# Patient Record
Sex: MALE | Race: OTHER | Employment: FULL TIME | URBAN - METROPOLITAN AREA
[De-identification: names, ages, dates, MRNs, and addresses within clinical notes are randomized per-mention and may not be internally consistent; named-entity substitution may affect disease eponyms.]

---

## 2024-04-09 NOTE — PROGRESS NOTES
Wellness Exam    CC: Patient is presenting for a wellness exam and to establish care.    HPI:   Current Complaints:   Anxiety/depression- pt used to be on duloxetine in the past. Did not like how it made him feel, stopped it. Would like to try a different medication.  Pt is also struggling with binge eating.  Hx of tobacco use over 11 years. Used to be pack per day. Now down to 4 cigarettes per day.     Pt has been experiencing cloudy urine x1 wk. Denies dysuria, urinary frequency, suprapubic tenderness.  Denies STD exposure.    Pertinent Family History:   Family History   Problem Relation Age of Onset    Other (htn) Mother     Diabetes Mother     Other (htn) Father     Heart Attack Father     Other (Other) Maternal Grandmother     Diabetes Maternal Grandmother     Diabetes Maternal Grandfather     Other (Other) Maternal Grandfather     Other (smoker) Paternal Grandmother     Heart Attack Paternal Grandfather         Last Health Maintenance Exam: unknown   Colonoscopy:  No recommendations at this time   PSA:  There are no preventive care reminders to display for this patient.   Reported Health: good.    History reviewed. No pertinent past medical history.  Past Surgical History:   Procedure Laterality Date    Appendectomy  01/2010     Social History     Socioeconomic History    Marital status: Single   Tobacco Use    Smoking status: Every Day     Current packs/day: 0.50     Average packs/day: 0.5 packs/day for 11.3 years (5.6 ttl pk-yrs)     Types: Cigarettes     Start date: 01/2013    Smokeless tobacco: Never   Vaping Use    Vaping status: Never Used   Substance and Sexual Activity    Drug use: Never    Sexual activity: Yes     Partners: Female   Other Topics Concern     Service No    Blood Transfusions No    Caffeine Concern Yes    Occupational Exposure No    Hobby Hazards No    Sleep Concern Yes    Stress Concern No    Weight Concern Yes    Special Diet No    Back Care No    Exercise No    Bike Helmet No     Seat Belt Yes    Self-Exams No     Current Outpatient Medications on File Prior to Visit   Medication Sig Dispense Refill    ergocalciferol 1.25 MG (65883 UT) Oral Cap TAKE 1 CAPSULE BY MOUTH ONCE A WEEK AS DIRECTED      FLUoxetine 10 MG Oral Cap  (Patient not taking: Reported on 4/10/2024)       No current facility-administered medications on file prior to visit.       Review of Systems   Constitutional: Negative for fever, chills and fatigue.   HENT: Negative for hearing loss, congestion, sore throat and neck pain.    Eyes: Negative for pain and visual disturbance.   Respiratory: Negative for cough and shortness of breath.    Cardiovascular: Negative for chest pain and palpitations.   Gastrointestinal: Negative for nausea, vomiting, abdominal pain and diarrhea.   Genitourinary: Negative for urgency, frequency and difficulty urinating.   Musculoskeletal: Negative for arthralgias and gait problem.   Skin: Negative for color change and rash.   Neurological: Negative for tremors, weakness and numbness.   Hematological: Negative for adenopathy. Does not bruise/bleed easily.   Psychiatric/Behavioral: Negative for confusion and agitation. The patient is not nervous/anxious.      /66   Pulse 94   Temp 97 °F (36.1 °C) (Oral)   Resp 16   Ht 6' 1\" (1.854 m)   Wt (!) 346 lb (156.9 kg)   SpO2 98%   BMI 45.65 kg/m²   Physical Exam   Constitutional: He is oriented to person, place, and time. He appears well-developed. No distress.   HENT: Normocephalic and atraumatic. Nose normal. TMs pearly gray, + light reflex.  Mucous membranes moist, dentition normal.  Oropharynx without erythema, exudate or tonsillar hypertrophy  Eyes: EOM are normal. Pupils are equal, round, and reactive to light. No scleral icterus. Fundoscopic exam: Red reflex b/l. No exudates, hemorrhages, a/v nicking, or papilledema seen b/l.  Neck: Normal range of motion. No thyromegaly present.   Cardiovascular: Normal rate, regular rhythm and normal  heart sounds.  Exam reveals no friction rub, no murmur heard.  Pulmonary/Chest: Effort normal and breath sounds normal b/l. He has no wheezes or rales.   Abdominal: Soft. Bowel sounds are normal. There is no tenderness. No HSM.  Abdominal aorta normal in size, no hernia appreciated.  Musculoskeletal: Normal range of motion. He exhibits no edema.   Lymphadenopathy: He has no cervical or supraclavicular adenopathy.   : deferred  Neurological: He is alert and oriented to person, place, and time.  DTRs +2 and symmetric b/l.   Skin: Skin is warm. No rash noted. No erythema, pallor or jaundice.   Psychiatric: He has a normal mood and affect. His behavior is normal.       Assessment and Plan:  Ramana Santoyo is a 28 year old male here for a wellness exam  Age appropriate cancer screening, labs, safety, immunizations were discussed with the patient and ordered as follows:    Ramana was seen today for physical.    Diagnoses and all orders for this visit:    Annual physical exam    Laboratory exam ordered as part of routine general medical examination  -     CBC With Differential With Platelet; Future  -     Comp Metabolic Panel (14); Future  -     Lipid Panel; Future  -     TSH W Reflex To Free T4; Future    Vitamin D deficiency  -     Vitamin D; Future    BMI 40.0-44.9, adult (HCC)  Binge eating  -     buPROPion  MG Oral Tablet 24 Hr; Take 1 tablet (150 mg total) by mouth daily.    Mixed anxiety and depressive disorder  -     buPROPion  MG Oral Tablet 24 Hr; Take 1 tablet (150 mg total) by mouth daily.   -follow up in 6wks  Cigarette nicotine dependence with other nicotine-induced disorder  -     buPROPion  MG Oral Tablet 24 Hr; Take 1 tablet (150 mg total) by mouth daily.    Screening examination for STD (sexually transmitted disease)  -     T Pallidum Screening [place]; Future  -     HIV AG AB Combo; Future  -     Urine Chlamydia/GC Amplification; Future    Cloudy urine  -     Urine Chlamydia/GC  Amplification; Future  -     UA/M WITH CULTURE REFLEX [3020]; Future    Other orders  -     TdaP (Adacel, Boostrix) [28013]       Orders Placed This Encounter   Procedures    CBC With Differential With Platelet     Standing Status:   Future     Number of Occurrences:   1     Standing Expiration Date:   4/10/2025    Comp Metabolic Panel (14)     Standing Status:   Future     Number of Occurrences:   1     Standing Expiration Date:   4/5/2025    Lipid Panel     Standing Status:   Future     Number of Occurrences:   1     Standing Expiration Date:   4/5/2025    TSH W Reflex To Free T4     Standing Status:   Future     Number of Occurrences:   1     Standing Expiration Date:   4/5/2025    Vitamin D     Standing Status:   Future     Number of Occurrences:   1     Standing Expiration Date:   4/10/2025     Order Specific Question:   Please pick the scenario that best fits the purpose for ordering this test     Answer:   General Screening/Vit D deficiency (25-Hydroxy)     Order Specific Question:   Release to patient     Answer:   Immediate    T Pallidum Screening [place]     Standing Status:   Future     Number of Occurrences:   1     Standing Expiration Date:   4/10/2025     Order Specific Question:   Release to patient     Answer:   Immediate    HIV AG AB Combo     Standing Status:   Future     Number of Occurrences:   1     Standing Expiration Date:   4/10/2025    UA/M WITH CULTURE REFLEX [3020]     Standing Status:   Future     Number of Occurrences:   1     Standing Expiration Date:   4/10/2025     Order Specific Question:   Release to patient     Answer:   Immediate      Health Maintenance Due   Topic Date Due    Annual Physical  Never done    DTaP,Tdap,and Td Vaccines (1 - Tdap) Never done    COVID-19 Vaccine (1 - 2023-24 season) Never done    Annual Depression Screening  Never done        His 5 year prevention plan includes: annual physical and labs. 30 mins exercise most days of the week and heart healthy diet    Patient/Caregiver Education:  Patient/Caregiver Education: There are no barriers to learning. Medical education done.  Outcome: Patient verbalizes understanding.      Return in 12m for annual physical. Return in 6wks for med check.  Educated by: JC Valverde

## 2024-04-10 ENCOUNTER — LAB ENCOUNTER (OUTPATIENT)
Dept: LAB | Age: 29
End: 2024-04-10
Payer: COMMERCIAL

## 2024-04-10 ENCOUNTER — OFFICE VISIT (OUTPATIENT)
Dept: INTERNAL MEDICINE CLINIC | Facility: CLINIC | Age: 29
End: 2024-04-10
Payer: COMMERCIAL

## 2024-04-10 VITALS
WEIGHT: 315 LBS | HEART RATE: 94 BPM | RESPIRATION RATE: 16 BRPM | TEMPERATURE: 97 F | OXYGEN SATURATION: 98 % | HEIGHT: 73 IN | SYSTOLIC BLOOD PRESSURE: 120 MMHG | DIASTOLIC BLOOD PRESSURE: 66 MMHG | BODY MASS INDEX: 41.75 KG/M2

## 2024-04-10 DIAGNOSIS — Z11.3 SCREENING EXAMINATION FOR STD (SEXUALLY TRANSMITTED DISEASE): ICD-10-CM

## 2024-04-10 DIAGNOSIS — Z00.00 LABORATORY EXAM ORDERED AS PART OF ROUTINE GENERAL MEDICAL EXAMINATION: ICD-10-CM

## 2024-04-10 DIAGNOSIS — F41.8 MIXED ANXIETY AND DEPRESSIVE DISORDER: ICD-10-CM

## 2024-04-10 DIAGNOSIS — Z00.00 ANNUAL PHYSICAL EXAM: Primary | ICD-10-CM

## 2024-04-10 DIAGNOSIS — E55.9 VITAMIN D DEFICIENCY: ICD-10-CM

## 2024-04-10 DIAGNOSIS — R82.90 CLOUDY URINE: ICD-10-CM

## 2024-04-10 DIAGNOSIS — F17.218 CIGARETTE NICOTINE DEPENDENCE WITH OTHER NICOTINE-INDUCED DISORDER: ICD-10-CM

## 2024-04-10 DIAGNOSIS — R63.2 BINGE EATING: ICD-10-CM

## 2024-04-10 DIAGNOSIS — Z82.49 FAMILY HISTORY OF CARDIAC DISORDER: ICD-10-CM

## 2024-04-10 PROBLEM — S39.012A LOW BACK STRAIN: Status: ACTIVE | Noted: 2023-08-10

## 2024-04-10 PROBLEM — F17.200 NICOTINE DEPENDENCE: Status: ACTIVE | Noted: 2023-08-10

## 2024-04-10 PROBLEM — E66.9 OBESITY WITH BODY MASS INDEX 30 OR GREATER: Status: RESOLVED | Noted: 2023-08-10 | Resolved: 2024-04-10

## 2024-04-10 PROBLEM — M54.50 LOW BACK PAIN: Status: ACTIVE | Noted: 2023-08-10

## 2024-04-10 PROBLEM — E66.9 OBESITY WITH BODY MASS INDEX 30 OR GREATER: Status: ACTIVE | Noted: 2023-08-10

## 2024-04-10 PROBLEM — E66.9 OBESE: Status: ACTIVE | Noted: 2023-09-26

## 2024-04-10 LAB
ALBUMIN SERPL-MCNC: 3.8 G/DL (ref 3.4–5)
ALBUMIN/GLOB SERPL: 1 {RATIO} (ref 1–2)
ALP LIVER SERPL-CCNC: 53 U/L
ALT SERPL-CCNC: 60 U/L
ANION GAP SERPL CALC-SCNC: 7 MMOL/L (ref 0–18)
AST SERPL-CCNC: 29 U/L (ref 15–37)
BASOPHILS # BLD AUTO: 0.06 X10(3) UL (ref 0–0.2)
BASOPHILS NFR BLD AUTO: 0.6 %
BILIRUB SERPL-MCNC: 0.7 MG/DL (ref 0.1–2)
BILIRUB UR QL STRIP.AUTO: NEGATIVE
BUN BLD-MCNC: 14 MG/DL (ref 9–23)
CALCIUM BLD-MCNC: 9.1 MG/DL (ref 8.5–10.1)
CHLORIDE SERPL-SCNC: 107 MMOL/L (ref 98–112)
CHOLEST SERPL-MCNC: 155 MG/DL (ref ?–200)
CLARITY UR REFRACT.AUTO: CLEAR
CO2 SERPL-SCNC: 23 MMOL/L (ref 21–32)
CREAT BLD-MCNC: 0.76 MG/DL
EGFRCR SERPLBLD CKD-EPI 2021: 126 ML/MIN/1.73M2 (ref 60–?)
EOSINOPHIL # BLD AUTO: 0.37 X10(3) UL (ref 0–0.7)
EOSINOPHIL NFR BLD AUTO: 3.7 %
ERYTHROCYTE [DISTWIDTH] IN BLOOD BY AUTOMATED COUNT: 13.2 %
FASTING PATIENT LIPID ANSWER: YES
FASTING STATUS PATIENT QL REPORTED: YES
GLOBULIN PLAS-MCNC: 3.9 G/DL (ref 2.8–4.4)
GLUCOSE BLD-MCNC: 99 MG/DL (ref 70–99)
GLUCOSE UR STRIP.AUTO-MCNC: NORMAL MG/DL
HCT VFR BLD AUTO: 46.7 %
HDLC SERPL-MCNC: 34 MG/DL (ref 40–59)
HGB BLD-MCNC: 16 G/DL
IMM GRANULOCYTES # BLD AUTO: 0.04 X10(3) UL (ref 0–1)
IMM GRANULOCYTES NFR BLD: 0.4 %
KETONES UR STRIP.AUTO-MCNC: NEGATIVE MG/DL
LDLC SERPL CALC-MCNC: 103 MG/DL (ref ?–100)
LEUKOCYTE ESTERASE UR QL STRIP.AUTO: NEGATIVE
LYMPHOCYTES # BLD AUTO: 2.67 X10(3) UL (ref 1–4)
LYMPHOCYTES NFR BLD AUTO: 26.6 %
MCH RBC QN AUTO: 29.6 PG (ref 26–34)
MCHC RBC AUTO-ENTMCNC: 34.3 G/DL (ref 31–37)
MCV RBC AUTO: 86.3 FL
MONOCYTES # BLD AUTO: 0.78 X10(3) UL (ref 0.1–1)
MONOCYTES NFR BLD AUTO: 7.8 %
NEUTROPHILS # BLD AUTO: 6.1 X10 (3) UL (ref 1.5–7.7)
NEUTROPHILS # BLD AUTO: 6.1 X10(3) UL (ref 1.5–7.7)
NEUTROPHILS NFR BLD AUTO: 60.9 %
NITRITE UR QL STRIP.AUTO: NEGATIVE
NONHDLC SERPL-MCNC: 121 MG/DL (ref ?–130)
OSMOLALITY SERPL CALC.SUM OF ELEC: 285 MOSM/KG (ref 275–295)
PH UR STRIP.AUTO: 5 [PH] (ref 5–8)
PLATELET # BLD AUTO: 177 10(3)UL (ref 150–450)
POTASSIUM SERPL-SCNC: 4 MMOL/L (ref 3.5–5.1)
PROT SERPL-MCNC: 7.7 G/DL (ref 6.4–8.2)
PROT UR STRIP.AUTO-MCNC: NEGATIVE MG/DL
RBC # BLD AUTO: 5.41 X10(6)UL
RBC UR QL AUTO: NEGATIVE
SODIUM SERPL-SCNC: 137 MMOL/L (ref 136–145)
SP GR UR STRIP.AUTO: 1.02 (ref 1–1.03)
T PALLIDUM AB SER QL IA: NONREACTIVE
TRIGL SERPL-MCNC: 96 MG/DL (ref 30–149)
TSI SER-ACNC: 1.32 MIU/ML (ref 0.36–3.74)
UROBILINOGEN UR STRIP.AUTO-MCNC: NORMAL MG/DL
VIT D+METAB SERPL-MCNC: 40.8 NG/ML (ref 30–100)
VLDLC SERPL CALC-MCNC: 16 MG/DL (ref 0–30)
WBC # BLD AUTO: 10 X10(3) UL (ref 4–11)

## 2024-04-10 PROCEDURE — 85025 COMPLETE CBC W/AUTO DIFF WBC: CPT

## 2024-04-10 PROCEDURE — 82306 VITAMIN D 25 HYDROXY: CPT

## 2024-04-10 PROCEDURE — 87591 N.GONORRHOEAE DNA AMP PROB: CPT

## 2024-04-10 PROCEDURE — 99385 PREV VISIT NEW AGE 18-39: CPT

## 2024-04-10 PROCEDURE — 96127 BRIEF EMOTIONAL/BEHAV ASSMT: CPT

## 2024-04-10 PROCEDURE — 36415 COLL VENOUS BLD VENIPUNCTURE: CPT

## 2024-04-10 PROCEDURE — 87491 CHLMYD TRACH DNA AMP PROBE: CPT

## 2024-04-10 PROCEDURE — 99203 OFFICE O/P NEW LOW 30 MIN: CPT

## 2024-04-10 PROCEDURE — 80061 LIPID PANEL: CPT

## 2024-04-10 PROCEDURE — 81003 URINALYSIS AUTO W/O SCOPE: CPT

## 2024-04-10 PROCEDURE — 84443 ASSAY THYROID STIM HORMONE: CPT

## 2024-04-10 PROCEDURE — 80053 COMPREHEN METABOLIC PANEL: CPT

## 2024-04-10 PROCEDURE — 90715 TDAP VACCINE 7 YRS/> IM: CPT

## 2024-04-10 PROCEDURE — 87389 HIV-1 AG W/HIV-1&-2 AB AG IA: CPT

## 2024-04-10 PROCEDURE — 90471 IMMUNIZATION ADMIN: CPT

## 2024-04-10 PROCEDURE — 86780 TREPONEMA PALLIDUM: CPT

## 2024-04-10 RX ORDER — ERGOCALCIFEROL 1.25 MG/1
CAPSULE ORAL
COMMUNITY

## 2024-04-10 RX ORDER — FLUOXETINE 10 MG/1
CAPSULE ORAL
COMMUNITY

## 2024-04-10 RX ORDER — BUPROPION HYDROCHLORIDE 150 MG/1
150 TABLET ORAL DAILY
Qty: 30 TABLET | Refills: 0 | Status: SHIPPED | OUTPATIENT
Start: 2024-04-10

## 2024-04-11 LAB
C TRACH DNA SPEC QL NAA+PROBE: NEGATIVE
N GONORRHOEA DNA SPEC QL NAA+PROBE: NEGATIVE

## 2024-05-21 NOTE — PROGRESS NOTES
Ramana Santoyo is a 29 year old male.  HPI:   HPI   Pt presents with urinary concerned. Few days ago noted grainy, orange output when urinated. Is experiencing bladder discomfort, urinary frequency and low back pain. Had noted cloudy urine in the past. Hx of kidney stone.  Denies dysuria, fever, recent STD exposure  Recent labs in April showed unremarkable UA, kidney function WNL, negative for STD.     Last month was started on Wellbutrin for tobacco cessation, anxiety/depression, and binge eating. States it has not helped with tobacco cessation but he has not tried to pick a day and actively cut down on smoking. It has helped with binge eating. Stating he still has some days with depression.   Current Outpatient Medications   Medication Sig Dispense Refill    buPROPion  MG Oral Tablet 24 Hr Take 1 tablet (300 mg total) by mouth daily. 30 tablet 0      No past medical history on file.   Social History:  Social History     Socioeconomic History    Marital status: Single   Tobacco Use    Smoking status: Every Day     Current packs/day: 0.50     Average packs/day: 0.5 packs/day for 11.4 years (5.7 ttl pk-yrs)     Types: Cigarettes     Start date: 01/2013    Smokeless tobacco: Never   Vaping Use    Vaping status: Never Used   Substance and Sexual Activity    Alcohol use: Yes     Comment: rare    Drug use: Never    Sexual activity: Yes     Partners: Female   Other Topics Concern     Service No    Blood Transfusions No    Caffeine Concern Yes    Occupational Exposure No    Hobby Hazards No    Sleep Concern Yes    Stress Concern No    Weight Concern Yes    Special Diet No    Back Care No    Exercise No    Bike Helmet No    Seat Belt Yes    Self-Exams No        REVIEW OF SYSTEMS:   GENERAL HEALTH: feels well otherwise. Denies fever, chills, unintentional weight change  SKIN: denies any unusual skin lesions or rashes  RESPIRATORY: denies shortness of breath with exertion, denies cough or  wheezing  CARDIOVASCULAR: denies chest pain or palpitations, denies leg swelling  GI: denies abdominal pain and denies heartburn. Denies nausea, vomiting, diarrhea, constipation  NEURO: denies headaches, dizziness, weakness, syncope  PSYCH: denies anxiety, depression, insomnia    EXAM:   /84   Pulse 88   Temp 97.2 °F (36.2 °C)   Resp 16   Ht 6' 1\" (1.854 m)   Wt (!) 350 lb (158.8 kg)   SpO2 95%   BMI 46.18 kg/m²   GENERAL: well developed, well nourished,in no apparent distress  SKIN: no rashes,no suspicious lesions, warm and dry  HEENT: atraumatic, normocephalic,ears and throat are clear  NECK: supple,no adenopathy, no thyromegaly  LUNGS: clear to auscultation b/l no W/R/R  CARDIO: RRR without murmur  GI: good BS's,no masses, HSM, distension or tenderness  EXTREMITIES: no cyanosis, clubbing or edema  MUSCULOSKELETAL: FROM, no joint swelling or bony tenderness  NEURO: a/ox3, no focal deficits  PSYCH: mood and affect normal    ASSESSMENT AND PLAN:     Encounter Diagnoses   Name Primary?    Abnormal urine sediment Yes    Suprapubic tenderness     Frequent urination     Flank pain     History of kidney stones     Cigarette nicotine dependence with other nicotine-induced disorder     Mixed anxiety and depressive disorder     BMI 40.0-44.9, adult (HCC)     -UA in office negative for blood, nitrate, leuks   -check CT urogram   -Increase Wellbutrin to 300mg   -tobacco cessation education provided.  Requested Prescriptions     Signed Prescriptions Disp Refills    buPROPion  MG Oral Tablet 24 Hr 30 tablet 0     Sig: Take 1 tablet (300 mg total) by mouth daily.         The patient indicates understanding of these issues and agrees to the plan.  The patient is asked to return in 4 wks.

## 2024-05-22 ENCOUNTER — OFFICE VISIT (OUTPATIENT)
Dept: INTERNAL MEDICINE CLINIC | Facility: CLINIC | Age: 29
End: 2024-05-22

## 2024-05-22 VITALS
HEART RATE: 88 BPM | RESPIRATION RATE: 16 BRPM | TEMPERATURE: 97 F | OXYGEN SATURATION: 95 % | HEIGHT: 73 IN | DIASTOLIC BLOOD PRESSURE: 84 MMHG | BODY MASS INDEX: 41.75 KG/M2 | WEIGHT: 315 LBS | SYSTOLIC BLOOD PRESSURE: 132 MMHG

## 2024-05-22 DIAGNOSIS — R35.0 FREQUENT URINATION: ICD-10-CM

## 2024-05-22 DIAGNOSIS — R10.819 SUPRAPUBIC TENDERNESS: ICD-10-CM

## 2024-05-22 DIAGNOSIS — R82.90 ABNORMAL URINE SEDIMENT: Primary | ICD-10-CM

## 2024-05-22 DIAGNOSIS — Z87.442 HISTORY OF KIDNEY STONES: ICD-10-CM

## 2024-05-22 DIAGNOSIS — F41.8 MIXED ANXIETY AND DEPRESSIVE DISORDER: ICD-10-CM

## 2024-05-22 DIAGNOSIS — R10.9 FLANK PAIN: ICD-10-CM

## 2024-05-22 DIAGNOSIS — F17.218 CIGARETTE NICOTINE DEPENDENCE WITH OTHER NICOTINE-INDUCED DISORDER: ICD-10-CM

## 2024-05-22 LAB
BILIRUBIN: NEGATIVE
GLUCOSE (URINE DIPSTICK): NEGATIVE MG/DL
KETONES (URINE DIPSTICK): NEGATIVE MG/DL
LEUKOCYTES: NEGATIVE
MULTISTIX LOT#: NORMAL NUMERIC
NITRITE, URINE: NEGATIVE
OCCULT BLOOD: NEGATIVE
PH, URINE: 5.5 (ref 4.5–8)
PROTEIN (URINE DIPSTICK): NEGATIVE MG/DL
SPECIFIC GRAVITY: 1.02 (ref 1–1.03)
UROBILINOGEN,SEMI-QN: 0.2 MG/DL (ref 0–1.9)

## 2024-05-22 PROCEDURE — 99214 OFFICE O/P EST MOD 30 MIN: CPT

## 2024-05-22 PROCEDURE — 81003 URINALYSIS AUTO W/O SCOPE: CPT

## 2024-05-22 RX ORDER — BUPROPION HYDROCHLORIDE 300 MG/1
300 TABLET ORAL DAILY
Qty: 30 TABLET | Refills: 0 | Status: SHIPPED | OUTPATIENT
Start: 2024-05-22

## 2024-05-24 ENCOUNTER — PATIENT MESSAGE (OUTPATIENT)
Dept: ADMINISTRATIVE | Age: 29
End: 2024-05-24

## 2024-05-24 NOTE — TELEPHONE ENCOUNTER
[facility]  online tfor status    CT UROGRAM(W+WO) W/3D(CPT=74178/69098)          Referral #: [id number]      Scheduled For: 05/28/2024    Status: pending authorization     Case Number: [id number]         Patient notified of pending status via [employer].     Appt Desk > Noted

## 2024-05-28 ENCOUNTER — HOSPITAL ENCOUNTER (OUTPATIENT)
Dept: CT IMAGING | Age: 29
Discharge: HOME | End: 2024-05-28

## 2024-05-28 DIAGNOSIS — R82.90 ABNORMAL URINE SEDIMENT: ICD-10-CM

## 2024-05-28 DIAGNOSIS — R10.819 SUPRAPUBIC TENDERNESS: ICD-10-CM

## 2024-05-28 DIAGNOSIS — R35.0 FREQUENT URINATION: ICD-10-CM

## 2024-05-28 DIAGNOSIS — Z87.442 HISTORY OF KIDNEY STONES: ICD-10-CM

## 2024-05-28 DIAGNOSIS — R10.9 FLANK PAIN: ICD-10-CM

## 2024-05-28 PROCEDURE — 76377 3D RENDER W/INTRP POSTPROCES: CPT

## 2024-05-28 PROCEDURE — 74178 CT ABD&PLV WO CNTR FLWD CNTR: CPT

## 2024-05-29 ENCOUNTER — TELEPHONE (OUTPATIENT)
Dept: INTERNAL MEDICINE CLINIC | Facility: CLINIC | Age: 29
End: 2024-05-29

## 2024-06-01 ENCOUNTER — OFFICE VISIT (OUTPATIENT)
Dept: INTERNAL MEDICINE CLINIC | Facility: CLINIC | Age: 29
End: 2024-06-01
Payer: COMMERCIAL

## 2024-06-01 VITALS
WEIGHT: 315 LBS | OXYGEN SATURATION: 98 % | BODY MASS INDEX: 41.75 KG/M2 | HEIGHT: 73 IN | HEART RATE: 92 BPM | TEMPERATURE: 97 F | DIASTOLIC BLOOD PRESSURE: 84 MMHG | SYSTOLIC BLOOD PRESSURE: 138 MMHG | RESPIRATION RATE: 18 BRPM

## 2024-06-01 DIAGNOSIS — F17.210 CIGARETTE NICOTINE DEPENDENCE WITHOUT COMPLICATION: ICD-10-CM

## 2024-06-01 DIAGNOSIS — N30.90 CYSTITIS: Primary | ICD-10-CM

## 2024-06-01 PROCEDURE — 99214 OFFICE O/P EST MOD 30 MIN: CPT | Performed by: NURSE PRACTITIONER

## 2024-06-01 NOTE — PROGRESS NOTES
HPI:    Patient ID: Ramana Santoyo is a 29 year old male.    Chief Complaint   Patient presents with    Medication Follow-Up     Following up on Levaquin        He took 3 days of levaquin because he noted some tingling in arms and legs. He continues to feel slight tingling in legs. Otherwise all his other symptoms are resolved. His girlfriend is translating for him today   Prior to starting wellbutrin he was smoking a pack a day. He is currently down to 3 cigarettes a day.     Tobacco:  Social History     Tobacco Use   Smoking Status Every Day    Current packs/day: 0.50    Average packs/day: 0.5 packs/day for 11.4 years (5.7 ttl pk-yrs)    Types: Cigarettes    Start date: 01/2013   Smokeless Tobacco Never     E-Cigarettes/Vaping       Questions Responses    E-Cigarette Use Never User    Passive Exposure No    Counseling Given No          E-Cigarette/Vaping Substances       Questions Responses    Nicotine No    THC No    CBD No    Flavoring No          E-Cigarette/Vaping Devices       Questions Responses    Disposable No    Pre-filled or Refillable Cartridge No    Refillable Tank No    Pre-filled Pod No           Tobacco cessation counseling for 3-10 minutes (add E/M code #72770).      Review of Systems   Constitutional: Negative.    Respiratory: Negative.     Cardiovascular: Negative.    Gastrointestinal: Negative.    Genitourinary: Negative.    Neurological: Negative.          No past medical history on file.  Past Surgical History:   Procedure Laterality Date    Appendectomy  01/2010     Family History   Problem Relation Age of Onset    Other (htn) Mother     Diabetes Mother     Other (htn) Father     Heart Attack Father     Other (Other) Maternal Grandmother     Diabetes Maternal Grandmother     Diabetes Maternal Grandfather     Other (Other) Maternal Grandfather     Other (smoker) Paternal Grandmother     Heart Attack Paternal Grandfather      Social History     Socioeconomic History    Marital status: Single    Tobacco Use    Smoking status: Every Day     Current packs/day: 0.50     Average packs/day: 0.5 packs/day for 11.4 years (5.7 ttl pk-yrs)     Types: Cigarettes     Start date: 01/2013    Smokeless tobacco: Never   Vaping Use    Vaping status: Never Used   Substance and Sexual Activity    Alcohol use: Yes     Comment: rare    Drug use: Never    Sexual activity: Yes     Partners: Female   Other Topics Concern     Service No    Blood Transfusions No    Caffeine Concern Yes    Occupational Exposure No    Hobby Hazards No    Sleep Concern Yes    Stress Concern No    Weight Concern Yes    Special Diet No    Back Care No    Exercise No    Bike Helmet No    Seat Belt Yes    Self-Exams No          Current Outpatient Medications   Medication Sig Dispense Refill    levoFLOXacin (LEVAQUIN) 500 MG Oral Tab Take 1 tablet (500 mg total) by mouth daily for 5 days. 5 tablet 0    buPROPion  MG Oral Tablet 24 Hr Take 1 tablet (300 mg total) by mouth daily. 30 tablet 0     Allergies:No Known Allergies    Lab Results   Component Value Date    GLU 99 04/10/2024    BUN 14 04/10/2024    CREATSERUM 0.76 04/10/2024    ANIONGAP 7 04/10/2024    CA 9.1 04/10/2024    OSMOCALC 285 04/10/2024    ALKPHO 53 04/10/2024    AST 29 04/10/2024    ALT 60 04/10/2024    BILT 0.7 04/10/2024    TP 7.7 04/10/2024    ALB 3.8 04/10/2024    GLOBULIN 3.9 04/10/2024     04/10/2024    K 4.0 04/10/2024     04/10/2024    CO2 23.0 04/10/2024     Lab Results   Component Value Date    WBC 10.0 04/10/2024    RBC 5.41 04/10/2024    HGB 16.0 04/10/2024    HCT 46.7 04/10/2024    MCV 86.3 04/10/2024    MCH 29.6 04/10/2024    MCHC 34.3 04/10/2024    RDW 13.2 04/10/2024    .0 04/10/2024     Lab Results   Component Value Date    CHOLEST 155 04/10/2024    TRIG 96 04/10/2024    HDL 34 (L) 04/10/2024     (H) 04/10/2024    VLDL 16 04/10/2024    NONHDLC 121 04/10/2024     No results found for: \"EAG\", \"A1C\"  Lab Results   Component Value Date     TSH 1.320 04/10/2024     Lab Results   Component Value Date    VITD 40.8 04/10/2024     No results found for: \"JAZMYNE\"  No results found for: \"FOLIC\", \"FOLATESER\", \"FOLATE\"  No results found for: \"IRON\", \"IRONTOT\"  No results found for: \"B12\", \"VITB12\"  No results found for: \"PHOS\", \"PHOSPHORUS\"  No results found for: \"MG\"     PHYSICAL EXAM:   /84   Pulse 92   Temp 97.1 °F (36.2 °C) (Temporal)   Resp 18   Ht 6' 1\" (1.854 m)   Wt (!) 348 lb 6.4 oz (158 kg)   SpO2 98%   BMI 45.97 kg/m²   Physical Exam  Vitals and nursing note reviewed. Exam conducted with a chaperone present.   Constitutional:       Appearance: Normal appearance. He is obese.   Cardiovascular:      Rate and Rhythm: Normal rate and regular rhythm.      Pulses: Normal pulses.      Heart sounds: Normal heart sounds. No murmur heard.  Pulmonary:      Effort: Pulmonary effort is normal. No respiratory distress.      Breath sounds: Normal breath sounds. No wheezing, rhonchi or rales.   Abdominal:      Palpations: Abdomen is soft.      Tenderness: There is no abdominal tenderness.   Neurological:      Mental Status: He is alert and oriented to person, place, and time.              ASSESSMENT/PLAN:   Diagnoses and all orders for this visit:    Cystitis- resolved    Cigarette nicotine dependence without complication- at this time pick a quit date to discontinue nicotine use    BMI 40.0-44.9, adult (HCC)- weight loss with low carb diet 150 min moderate activity whit Muller, APRN

## 2024-06-04 ENCOUNTER — PATIENT MESSAGE (OUTPATIENT)
Dept: INTERNAL MEDICINE CLINIC | Facility: CLINIC | Age: 29
End: 2024-06-04

## 2024-06-04 DIAGNOSIS — N30.90 CYSTITIS: Primary | ICD-10-CM

## 2024-06-04 RX ORDER — SULFAMETHOXAZOLE AND TRIMETHOPRIM 800; 160 MG/1; MG/1
1 TABLET ORAL 2 TIMES DAILY
Qty: 14 TABLET | Refills: 0 | Status: SHIPPED | OUTPATIENT
Start: 2024-06-04 | End: 2024-06-11

## 2024-06-04 NOTE — TELEPHONE ENCOUNTER
RONEN Brown: Bactrim (160 mg/800 mg) orally twice daily x 7 days. Is symptoms return after completing the treatment to notify the office.

## 2024-06-04 NOTE — TELEPHONE ENCOUNTER
From: Ramana Santoyo  To: Stephanie Castano  Sent: 6/4/2024 7:57 AM CDT  Subject: Update     Hello    I know my test results keep coming up normal but this morning my urine came out the same. It’s making me very worried. Is there anything else that can cause this

## 2024-06-23 DIAGNOSIS — F17.218 CIGARETTE NICOTINE DEPENDENCE WITH OTHER NICOTINE-INDUCED DISORDER: ICD-10-CM

## 2024-06-23 DIAGNOSIS — F41.8 MIXED ANXIETY AND DEPRESSIVE DISORDER: ICD-10-CM

## 2024-06-23 RX ORDER — BUPROPION HYDROCHLORIDE 300 MG/1
300 TABLET ORAL DAILY
Qty: 30 TABLET | Refills: 0 | Status: SHIPPED | OUTPATIENT
Start: 2024-06-23

## 2024-06-23 NOTE — TELEPHONE ENCOUNTER
Last time medication was refilled: 5/22/24  Next office visit due/scheduled: no apt  Last office visit: 6/1/24  Last Labs: 4/10/24

## 2024-07-25 DIAGNOSIS — F41.8 MIXED ANXIETY AND DEPRESSIVE DISORDER: ICD-10-CM

## 2024-07-25 DIAGNOSIS — F17.218 CIGARETTE NICOTINE DEPENDENCE WITH OTHER NICOTINE-INDUCED DISORDER: ICD-10-CM

## 2024-07-25 RX ORDER — BUPROPION HYDROCHLORIDE 300 MG/1
300 TABLET ORAL DAILY
Qty: 30 TABLET | Refills: 0 | Status: SHIPPED | OUTPATIENT
Start: 2024-07-25

## 2024-07-25 NOTE — TELEPHONE ENCOUNTER
Last time medication was refilled 06/23/2024  Last office visit  06/01/2024  Next office visit due/scheduled No future appointments.    Medication not on protocol, routed to Stephanie Castano Nurse Practitioner.

## 2024-08-07 ENCOUNTER — OFFICE VISIT (OUTPATIENT)
Dept: SURGERY | Facility: CLINIC | Age: 29
End: 2024-08-07
Payer: COMMERCIAL

## 2024-08-07 DIAGNOSIS — N32.89 BLADDER WALL THICKENING: ICD-10-CM

## 2024-08-07 DIAGNOSIS — R31.9 HEMATURIA, UNSPECIFIED TYPE: Primary | ICD-10-CM

## 2024-08-07 DIAGNOSIS — N40.1 BENIGN LOCALIZED PROSTATIC HYPERPLASIA WITH LOWER URINARY TRACT SYMPTOMS (LUTS): ICD-10-CM

## 2024-08-07 DIAGNOSIS — N35.919 STRICTURE OF MALE URETHRA, UNSPECIFIED STRICTURE TYPE: ICD-10-CM

## 2024-08-07 LAB
APPEARANCE: CLEAR
BILIRUBIN: NEGATIVE
GLUCOSE (URINE DIPSTICK): NEGATIVE MG/DL
HYALINE CASTS #/AREA URNS AUTO: PRESENT /LPF
KETONES (URINE DIPSTICK): NEGATIVE MG/DL
LEUKOCYTES: NEGATIVE
MULTISTIX LOT#: ABNORMAL NUMERIC
NITRITE, URINE: NEGATIVE
PH, URINE: 5.5 (ref 4.5–8)
PROTEIN (URINE DIPSTICK): NEGATIVE MG/DL
SPECIFIC GRAVITY: 1.03 (ref 1–1.03)
UROBILINOGEN,SEMI-QN: 0.2 MG/DL (ref 0–1.9)

## 2024-08-07 PROCEDURE — 99244 OFF/OP CNSLTJ NEW/EST MOD 40: CPT | Performed by: UROLOGY

## 2024-08-07 PROCEDURE — 81003 URINALYSIS AUTO W/O SCOPE: CPT | Performed by: UROLOGY

## 2024-08-07 PROCEDURE — 51798 US URINE CAPACITY MEASURE: CPT | Performed by: UROLOGY

## 2024-08-07 NOTE — PROGRESS NOTES
Urology Clinic Note - New Patient    Referring Provider:  Gladis Muller, APRN  [address]  [facility] [address]  [place] [address]     Primary Care Provider:  Rock Baum MD     Chief Complaint:   LUTS, hematuria     HPI:     Ramana Santoyo is a 29 year old male with history of obesity, smoking referred for hematuria, sediment in urine, LUTS  Spouse interpreting per preference    Patient saw his PCP a few months ago with dysuria; hematuria and \"sediment\" in urine.   No trauma. Does have history of kidney stones at 16. No family history pertinent.    Urine YIN/infectious workup 4/2024 negative.   Urogram 5/28/24;   Impression  CONCLUSION:    1. No evidence of upper tract urothelial disease.  No hydronephrosis or nephrolithiasis.  2. Mild circumferential urinary bladder wall thickening, correlate for cystitis.  3. Diffuse hepatic steatosis.  Hepatomegaly.  Splenomegaly.    Here for evaluation. He states his symptoms come and go.  For the above-stated kidney stone, he spontaneously passed this.  This was in [facility].  He also has a history of possible urinary retention a few years ago while in [place], he apparently required CIC for this and then was voiding well after.  Today he has ongoing LUTS.  AUA symptom score is 19, he has urgency, frequency, incomplete emptying, hesitancy.  He continues to occasionally pass some sediment in the urine.  No recent gross hematuria, however he did have gross hematuria at previous presentation to his PCP.   He does smoke cigarettes.  No history of urologic trauma.    UA with trace blood.   PVR 8cc.       PSA:  No results found for: \"PSA\", \"PERCENTPSA\", \"PSAS\", \"PSAULTRA\", \"QPSA\", \"PSATOT\", \"TOTPSADX\", \"TOTPSASCREEN\"   Last Cr was 0.76 done on 4/10/2024.      History:   No past medical history on file.    Past Surgical History:   Procedure Laterality Date    Appendectomy  01/2010       Family History   Problem Relation Age of Onset    Other (htn) Mother     Diabetes Mother     Other  (htn) Father     Heart Attack Father     Other (Other) Maternal Grandmother     Diabetes Maternal Grandmother     Diabetes Maternal Grandfather     Other (Other) Maternal Grandfather     Other (smoker) Paternal Grandmother     Heart Attack Paternal Grandfather        Social History     Socioeconomic History    Marital status: Single   Tobacco Use    Smoking status: Every Day     Current packs/day: 0.50     Average packs/day: 0.5 packs/day for 11.6 years (5.8 ttl pk-yrs)     Types: Cigarettes     Start date: 01/2013    Smokeless tobacco: Never   Vaping Use    Vaping status: Never Used   Substance and Sexual Activity    Alcohol use: Yes     Comment: rare    Drug use: Never    Sexual activity: Yes     Partners: Female   Other Topics Concern     Service No    Blood Transfusions No    Caffeine Concern Yes    Occupational Exposure No    Hobby Hazards No    Sleep Concern Yes    Stress Concern No    Weight Concern Yes    Special Diet No    Back Care No    Exercise No    Bike Helmet No    Seat Belt Yes    Self-Exams No       Medications (Active prior to today's visit):  Current Outpatient Medications   Medication Sig Dispense Refill    BUPROPION  MG Oral Tablet 24 Hr TAKE 1 TABLET BY MOUTH EVERY DAY 30 tablet 0       Allergies:  No Known Allergies      Review of Systems:   A comprehensive 10-point review of systems was completed.  Pertinent positives and negatives are noted in the the HPI.    Physical Exam:   CONSTITUTIONAL: Well developed, well nourished, in no acute distress  NEUROLOGIC: Alert and oriented  HEAD: Normocephalic, atraumatic  ENT: Hearing intact   RESPIRATORY: Normal respiratory effort  SKIN: No evident rashes  ABDOMEN: Soft, non-tender, non-distended  GENITOURINARY: Normal phallus; uncircumcised, orthotopic meatus, normal bilateral testicles    No results found.      Assessment & Plan:       Ramana Santoyo is a 29 year old male with history of obesity, smoking referred for hematuria, sediment  in urine, LUTS  Spouse interpreting per preference    Discussed patient's symptoms in detail.  For the workup of his hematuria, significant voiding symptoms and abnormal appearing urine I recommended flexible cystoscopy.  He would like to do this.  We discussed the potential that he would have a urethral stricture given his history, we discussed that this is small we can try to dilate in the clinic otherwise book him for surgery.  He does not appear to be in retention.  No history of UTI or other infectious symptoms.  I reviewed his CT scan, no significant abnormalities aside from mild bladder wall thickening.      Thank you for this consult.    I have personally reviewed all relevant medical records, labs, and imaging.          Beto Huston MD  Staff Urologist  [facility]  Office: [phone]       Never smoker

## 2024-08-12 ENCOUNTER — TELEPHONE (OUTPATIENT)
Dept: SURGERY | Facility: CLINIC | Age: 29
End: 2024-08-12

## 2024-08-16 ENCOUNTER — TELEPHONE (OUTPATIENT)
Dept: SURGERY | Facility: CLINIC | Age: 29
End: 2024-08-16

## 2024-08-16 NOTE — TELEPHONE ENCOUNTER
This encounter is now closed.     RN called wife. 9/18 rescheduled. MD unavailable. Appt offered on 9/25 Wed at 9AM. She agreed to plans.

## 2024-09-11 ENCOUNTER — TELEPHONE (OUTPATIENT)
Dept: SURGERY | Facility: CLINIC | Age: 29
End: 2024-09-11

## 2024-09-16 DIAGNOSIS — F41.8 MIXED ANXIETY AND DEPRESSIVE DISORDER: ICD-10-CM

## 2024-09-16 DIAGNOSIS — F17.218 CIGARETTE NICOTINE DEPENDENCE WITH OTHER NICOTINE-INDUCED DISORDER: ICD-10-CM

## 2024-09-17 RX ORDER — BUPROPION HYDROCHLORIDE 300 MG/1
300 TABLET ORAL DAILY
Qty: 30 TABLET | Refills: 0 | Status: SHIPPED | OUTPATIENT
Start: 2024-09-17

## 2024-09-17 NOTE — TELEPHONE ENCOUNTER
Last time medication was refilled 07/25/2024  Last office visit  06/01/2024  Next office visit due/scheduled No Future Appointments    Medication not on protocol.

## 2024-09-24 NOTE — PROGRESS NOTES
Clinic Procedure Note    INDICATIONS:        Ramana Santoyo is a 29 year old male with history of obesity, smoking referred for hematuria, sediment in urine, LUTS  Spouse interpreting per preference     Patient saw his PCP a few months ago with dysuria; hematuria and \"sediment\" in urine.   No trauma. Does have history of kidney stones at 16. No family history pertinent.    Urine YIN/infectious workup 4/2024 negative.   Urogram 5/28/24;   Impression  CONCLUSION:    1. No evidence of upper tract urothelial disease.  No hydronephrosis or nephrolithiasis.  2. Mild circumferential urinary bladder wall thickening, correlate for cystitis.  3. Diffuse hepatic steatosis.  Hepatomegaly.  Splenomegaly.     Here for evaluation. He states his symptoms come and go.  For the above-stated kidney stone, he spontaneously passed this.  This was in [facility].  He also has a history of possible urinary retention a few years ago while in [place], he apparently required CIC for this and then was voiding well after.  Saw me 8/7/24; ongoing LUTS.  AUA symptom score is 19, he has urgency, frequency, incomplete emptying, hesitancy.  He continues to occasionally pass some sediment in the urine.  No recent gross hematuria, however he did have gross hematuria at previous presentation to his PCP.   He does smoke cigarettes.  No history of urologic trauma.  UA with trace blood. Was positive for uric acid, fernando oxalate,   PVR 8cc.     At last visit; we decided to proceed with cystoscopy. Here for this now.   VIKOR testing was negative for UTI/STI.   Doing well today; had 1 episode of pain since last visit which resolved.          PROCEDURE:       1. Flexible cystourethroscopy    DATE OF PROCEDURE: 9/24/2024     PRE-PROCEDURE DIAGNOSIS: Gross hematuria, bladder wall thickening, LUTS, hx of stricture     POST-PROCEDURE DIAGNOSIS: Same     SURGEON: Beto Huston MD    FINDINGS:    Urethra: Orthotopic meatus,bulbar urethra with mild concentric ~18fr  narrowing about ~1cm in length; easily able to accommodate scope     Prostate: Mildly enlarged without signs of obstruction, mildly high bladder neck, minimal intravesical protrusion    Bladder: Normal mucosa with no papillary lesions or erythema, minimal trabeculation    Ureteral orifices: Orthotopic    Other findings: None    PROCEDURE:   Patient was brought to the procedure suite and a time-out was performed identifiying the patient,  and procedure to be performed. The risks and benefits of the procedure were once again discussed with the patient including bleeding, infection, and dysuria. The patient agreed to proceed. The patient did not have any signs or symptoms of active UTI.    He was placed in supine position on the table and the penis was prepped and draped in the standard sterile fashion. Urojet was instilled per urethra for local anesthetic effect. A flexible cystoscope was inserted per urethra. The bladder was fully inspected (including retroflexion) and showed findings as above. Both ureteral orifices were orthotopic. A barbotage was obtained. The prostate was carefully viewed on removal of the scope, with findings as above. The scope was then carefully removed.    There were no complications and the patient tolerated the procedure well.    IMPRESSION AND PLAN:     LUTS   Hematuria  Pelvic pain   Hx of ?stricture   Bladder wall thickening    Discussed patient's symptoms in detail.  Appears that he has some history of urethral stricture.  Cystoscopy today with very mild concentric narrowing in the bulbar urethra.  This area was widely patent and the tissue appeared overall healthy.  We discussed options that would include observation, a formal dilation (however given open nature would likely not recommend this) versus CIC daily.  His symptoms overall appear to be improving.  For now, we will plan for observation.  If any symptoms progression can plan for a formal dilation versus initiating CIC.  We  can also trial pelvic floor physical therapy in the future if his symptoms do not improve.  He will return to see me in 6 months, sooner if any worsening issues.  Follow-up cytology.      Beto Huston MD  Staff Urologist  [facility]  Office: [phone]

## 2024-09-25 ENCOUNTER — PROCEDURE (OUTPATIENT)
Dept: SURGERY | Facility: CLINIC | Age: 29
End: 2024-09-25
Payer: COMMERCIAL

## 2024-09-25 DIAGNOSIS — N40.1 BENIGN LOCALIZED PROSTATIC HYPERPLASIA WITH LOWER URINARY TRACT SYMPTOMS (LUTS): Primary | ICD-10-CM

## 2024-09-25 LAB
APPEARANCE: CLEAR
BILIRUBIN: NEGATIVE
GLUCOSE (URINE DIPSTICK): NEGATIVE MG/DL
KETONES (URINE DIPSTICK): NEGATIVE MG/DL
MULTISTIX LOT#: ABNORMAL NUMERIC
NITRITE, URINE: NEGATIVE
PH, URINE: 6 (ref 4.5–8)
PROTEIN (URINE DIPSTICK): NEGATIVE MG/DL
SPECIFIC GRAVITY: 1.02 (ref 1–1.03)
URINE-COLOR: YELLOW
UROBILINOGEN,SEMI-QN: 0.2 MG/DL (ref 0–1.9)

## 2024-09-25 PROCEDURE — 81003 URINALYSIS AUTO W/O SCOPE: CPT | Performed by: UROLOGY

## 2024-09-25 PROCEDURE — 52000 CYSTOURETHROSCOPY: CPT | Performed by: UROLOGY

## 2024-09-25 RX ORDER — SULFAMETHOXAZOLE/TRIMETHOPRIM 800-160 MG
1 TABLET ORAL ONCE
Status: COMPLETED | OUTPATIENT
Start: 2024-09-25 | End: 2024-09-25

## 2024-09-25 RX ADMIN — SULFAMETHOXAZOLE/TRIMETHOPRIM 1 TABLET: 800-160 MG TABLET ORAL at 09:48:00

## 2024-09-26 LAB — NON GYNE INTERPRETATION: NEGATIVE

## 2024-11-16 DIAGNOSIS — F41.8 MIXED ANXIETY AND DEPRESSIVE DISORDER: ICD-10-CM

## 2024-11-16 DIAGNOSIS — F17.218 CIGARETTE NICOTINE DEPENDENCE WITH OTHER NICOTINE-INDUCED DISORDER: ICD-10-CM

## 2024-11-16 RX ORDER — BUPROPION HYDROCHLORIDE 300 MG/1
300 TABLET ORAL DAILY
Qty: 30 TABLET | Refills: 0 | Status: SHIPPED | OUTPATIENT
Start: 2024-11-16

## 2024-11-16 NOTE — TELEPHONE ENCOUNTER
Last time medication was refilled 09/17/2024  Last office visit  06/01/2024  Next office visit due/scheduled No Future Appointments    Medication not on protocol.

## 2024-12-17 DIAGNOSIS — F17.218 CIGARETTE NICOTINE DEPENDENCE WITH OTHER NICOTINE-INDUCED DISORDER: ICD-10-CM

## 2024-12-17 DIAGNOSIS — F41.8 MIXED ANXIETY AND DEPRESSIVE DISORDER: ICD-10-CM

## 2024-12-17 RX ORDER — BUPROPION HYDROCHLORIDE 300 MG/1
300 TABLET ORAL DAILY
Qty: 30 TABLET | Refills: 0 | Status: SHIPPED | OUTPATIENT
Start: 2024-12-17

## 2024-12-17 NOTE — TELEPHONE ENCOUNTER
Last time medication was refilled 11/16/2024  Last office visit  06/01/2024  Next office visit due/scheduled No future appointments.    Medication not on protocol.

## 2025-04-02 ENCOUNTER — OFFICE VISIT (OUTPATIENT)
Dept: SURGERY | Facility: CLINIC | Age: 30
End: 2025-04-02
Payer: COMMERCIAL

## 2025-04-02 DIAGNOSIS — R31.9 HEMATURIA, UNSPECIFIED TYPE: Primary | ICD-10-CM

## 2025-04-02 LAB
APPEARANCE: CLEAR
BILIRUBIN: NEGATIVE
GLUCOSE (URINE DIPSTICK): NEGATIVE MG/DL
KETONES (URINE DIPSTICK): NEGATIVE MG/DL
LEUKOCYTES: NEGATIVE
MULTISTIX LOT#: ABNORMAL NUMERIC
NITRITE, URINE: NEGATIVE
PH, URINE: 5.5 (ref 4.5–8)
PROTEIN (URINE DIPSTICK): NEGATIVE MG/DL
SPECIFIC GRAVITY: >=1.03 (ref 1–1.03)
UROBILINOGEN,SEMI-QN: 0.2 MG/DL (ref 0–1.9)

## 2025-04-02 PROCEDURE — 99214 OFFICE O/P EST MOD 30 MIN: CPT | Performed by: UROLOGY

## 2025-04-02 PROCEDURE — 51798 US URINE CAPACITY MEASURE: CPT | Performed by: UROLOGY

## 2025-04-02 PROCEDURE — 81003 URINALYSIS AUTO W/O SCOPE: CPT | Performed by: UROLOGY

## 2025-04-02 NOTE — PROGRESS NOTES
Urology Clinic Note    Primary Care Provider:  Rock Baum MD     Chief Complaint:     LUTS    HPI:      Ramana Santoyo is a 29 year old male with history of obesity, smoking referred for hematuria, sediment in urine, LUTS  Spouse interpreting per preference     Patient saw his PCP a few months ago with dysuria; hematuria and \"sediment\" in urine.   No trauma. Does have history of kidney stones at 16. No family history pertinent.    Urine YIN/infectious workup 4/2024 negative.   Urogram 5/28/24;   Impression  CONCLUSION:    1. No evidence of upper tract urothelial disease.  No hydronephrosis or nephrolithiasis.  2. Mild circumferential urinary bladder wall thickening, correlate for cystitis.  3. Diffuse hepatic steatosis.  Hepatomegaly.  Splenomegaly.     Patient then establish me in August 2024.   For the above-stated kidney stone, he spontaneously passed this.  This was in [facility].  He also has a history of possible urinary retention a few years ago while in [place], he apparently required CIC for this and then was voiding well after.  Saw me 8/7/24; ongoing LUTS.  AUA symptom score is 19, he has urgency, frequency, incomplete emptying, hesitancy.  He continues to occasionally pass some sediment in the urine.  No recent gross hematuria, however he did have gross hematuria at previous presentation to his PCP.   He does smoke cigarettes.  No history of urologic trauma.  UA with trace blood. Was positive for uric acid, fernando oxalate,   PVR 8cc.      VIKOR testing was negative for UTI/STI.     Cystoscopy was then done on 9/25/2024-     Urethra: Orthotopic meatus,bulbar urethra with mild concentric ~18fr narrowing about ~1cm in length; easily able to accommodate scope     PFPT was discussed as well as possible CIC in the future if needed.  We did discuss that his above concentric narrowing did not appear clinically significant.  He was to let me know if he had any issues in the interval.    He now presents for  follow-up about 6 months from prior visit.  He reports mostly resolution in his above symptoms.  He does have some ongoing LUTS, however today him and his partner add that he has 1 energy drink and at least 3 sodas a day.  He feels like he empties well.  His PVR is 0.  Sometimes has some discomfort with voiding.  Stream is strong and straight.    PSA:  No results found for: \"PSA\", \"PERCENTPSA\", \"PSAS\", \"PSAULTRA\", \"QPSA\", \"PSATOT\", \"TOTPSADX\", \"TOTPSASCREEN\"     History:   No past medical history on file.    Past Surgical History:   Procedure Laterality Date    Appendectomy  01/2010       Family History   Problem Relation Age of Onset    Other (htn) Mother     Diabetes Mother     Other (htn) Father     Heart Attack Father     Other (Other) Maternal Grandmother     Diabetes Maternal Grandmother     Diabetes Maternal Grandfather     Other (Other) Maternal Grandfather     Other (smoker) Paternal Grandmother     Heart Attack Paternal Grandfather        Social History     Socioeconomic History    Marital status:    Tobacco Use    Smoking status: Every Day     Current packs/day: 0.50     Average packs/day: 0.5 packs/day for 12.2 years (6.1 ttl pk-yrs)     Types: Cigarettes     Start date: 01/2013    Smokeless tobacco: Never   Vaping Use    Vaping status: Never Used   Substance and Sexual Activity    Alcohol use: Yes     Comment: rare    Drug use: Never    Sexual activity: Yes     Partners: Female   Other Topics Concern     Service No    Blood Transfusions No    Caffeine Concern Yes    Occupational Exposure No    Hobby Hazards No    Sleep Concern Yes    Stress Concern No    Weight Concern Yes    Special Diet No    Back Care No    Exercise No    Bike Helmet No    Seat Belt Yes    Self-Exams No     Social Drivers of Health      Received from [facility]       Medications (Active prior to today's visit):  Current Outpatient Medications   Medication Sig Dispense Refill    BUPROPION  MG Oral  Tablet 24 Hr TAKE 1 TABLET BY MOUTH EVERY DAY 30 tablet 0       Allergies:  Allergies[1]    Review of Systems:   A comprehensive 10-point review of systems was completed.  Pertinent positives and negatives are noted in the the HPI.    Physical Exam:   CONSTITUTIONAL: Well developed, well nourished, in no acute distress  ABDOMEN: Soft, non-tender, non-distended     Assessment & Plan:     LUTS   Hematuria  Pelvic pain   Hx of ?stricture   Bladder wall thickening    Patient with mostly resolution in his symptoms.  No significant signs of stricture at this time.  Most importantly, it appears that he has some urgency and frequency.  We did discuss that this could be related to his energy drink, soda consumption.  We discussed behavioral modification and cutting down on these beverages.  He is in agreement with this.  He does have some slight discomfort when he urinates.  PVR is low.  Cystoscopy as above.  Discussed options.  He wants to hold off on pelvic floor physical therapy or any other interventions for now.  He will let me know if he has any other issues.  He will return in about 1 year    In total, 30 minutes were spent on this patient encounter (including chart review, patient history, physical, and counseling, documentation, and communication).    Beto Huston MD  Staff Urologist  [facility]  Office: [phone]         [1] No Known Allergies

## 2025-08-13 ENCOUNTER — LAB ENCOUNTER (OUTPATIENT)
Dept: LAB | Age: 30
End: 2025-08-13
Attending: PHYSICIAN ASSISTANT

## 2025-08-13 ENCOUNTER — OFFICE VISIT (OUTPATIENT)
Dept: INTERNAL MEDICINE CLINIC | Facility: CLINIC | Age: 30
End: 2025-08-13

## 2025-08-13 VITALS
HEART RATE: 78 BPM | TEMPERATURE: 98 F | OXYGEN SATURATION: 98 % | RESPIRATION RATE: 18 BRPM | BODY MASS INDEX: 41.75 KG/M2 | SYSTOLIC BLOOD PRESSURE: 118 MMHG | DIASTOLIC BLOOD PRESSURE: 70 MMHG | HEIGHT: 73 IN | WEIGHT: 315 LBS

## 2025-08-13 DIAGNOSIS — M77.12 LATERAL EPICONDYLITIS, LEFT ELBOW: ICD-10-CM

## 2025-08-13 DIAGNOSIS — Z00.00 LABORATORY EXAM ORDERED AS PART OF ROUTINE GENERAL MEDICAL EXAMINATION: ICD-10-CM

## 2025-08-13 DIAGNOSIS — Z00.00 ROUTINE PHYSICAL EXAMINATION: Primary | ICD-10-CM

## 2025-08-13 DIAGNOSIS — F17.210 CIGARETTE NICOTINE DEPENDENCE WITHOUT COMPLICATION: ICD-10-CM

## 2025-08-13 DIAGNOSIS — R10.11 RIGHT UPPER QUADRANT ABDOMINAL PAIN: ICD-10-CM

## 2025-08-13 PROBLEM — S39.012A LOW BACK STRAIN: Status: RESOLVED | Noted: 2023-08-10 | Resolved: 2025-08-13

## 2025-08-13 PROBLEM — M54.50 LOW BACK PAIN: Status: RESOLVED | Noted: 2023-08-10 | Resolved: 2025-08-13

## 2025-08-13 PROBLEM — E66.9 OBESE: Status: RESOLVED | Noted: 2023-09-26 | Resolved: 2025-08-13

## 2025-08-13 LAB
ALBUMIN SERPL-MCNC: 4.7 G/DL (ref 3.2–4.8)
ALBUMIN/GLOB SERPL: 1.5 (ref 1–2)
ALP LIVER SERPL-CCNC: 54 U/L (ref 45–117)
ALT SERPL-CCNC: 47 U/L (ref 10–49)
ANION GAP SERPL CALC-SCNC: 11 MMOL/L (ref 0–18)
AST SERPL-CCNC: 29 U/L (ref ?–34)
BASOPHILS # BLD AUTO: 0.07 X10(3) UL (ref 0–0.2)
BASOPHILS NFR BLD AUTO: 0.6 %
BILIRUB SERPL-MCNC: 0.7 MG/DL (ref 0.3–1.2)
BILIRUB UR QL STRIP.AUTO: NEGATIVE
BUN BLD-MCNC: 13 MG/DL (ref 9–23)
CALCIUM BLD-MCNC: 10 MG/DL (ref 8.7–10.6)
CHLORIDE SERPL-SCNC: 104 MMOL/L (ref 98–112)
CHOLEST SERPL-MCNC: 149 MG/DL (ref ?–200)
CLARITY UR REFRACT.AUTO: CLEAR
CO2 SERPL-SCNC: 24 MMOL/L (ref 21–32)
COLOR UR AUTO: YELLOW
CREAT BLD-MCNC: 0.81 MG/DL (ref 0.7–1.3)
EGFRCR SERPLBLD CKD-EPI 2021: 122 ML/MIN/1.73M2 (ref 60–?)
EOSINOPHIL # BLD AUTO: 0.46 X10(3) UL (ref 0–0.7)
EOSINOPHIL NFR BLD AUTO: 3.8 %
ERYTHROCYTE [DISTWIDTH] IN BLOOD BY AUTOMATED COUNT: 13.4 %
EST. AVERAGE GLUCOSE BLD GHB EST-MCNC: 126 MG/DL (ref 68–126)
FASTING PATIENT LIPID ANSWER: YES
FASTING STATUS PATIENT QL REPORTED: YES
GLOBULIN PLAS-MCNC: 3.2 G/DL (ref 2–3.5)
GLUCOSE BLD-MCNC: 81 MG/DL (ref 70–99)
GLUCOSE UR STRIP.AUTO-MCNC: NORMAL MG/DL
HBA1C MFR BLD: 6 % (ref ?–5.7)
HCT VFR BLD AUTO: 47.6 % (ref 39–53)
HDLC SERPL-MCNC: 35 MG/DL (ref 40–59)
HGB BLD-MCNC: 15.9 G/DL (ref 13–17.5)
IMM GRANULOCYTES # BLD AUTO: 0.06 X10(3) UL (ref 0–1)
IMM GRANULOCYTES NFR BLD: 0.5 %
KETONES UR STRIP.AUTO-MCNC: NEGATIVE MG/DL
LDLC SERPL CALC-MCNC: 95 MG/DL (ref ?–100)
LEUKOCYTE ESTERASE UR QL STRIP.AUTO: NEGATIVE
LYMPHOCYTES # BLD AUTO: 2.86 X10(3) UL (ref 1–4)
LYMPHOCYTES NFR BLD AUTO: 23.6 %
MCH RBC QN AUTO: 28.3 PG (ref 26–34)
MCHC RBC AUTO-ENTMCNC: 33.4 G/DL (ref 31–37)
MCV RBC AUTO: 84.8 FL (ref 80–100)
MONOCYTES # BLD AUTO: 0.9 X10(3) UL (ref 0.1–1)
MONOCYTES NFR BLD AUTO: 7.4 %
NEUTROPHILS # BLD AUTO: 7.75 X10 (3) UL (ref 1.5–7.7)
NEUTROPHILS # BLD AUTO: 7.75 X10(3) UL (ref 1.5–7.7)
NEUTROPHILS NFR BLD AUTO: 64.1 %
NITRITE UR QL STRIP.AUTO: NEGATIVE
NONHDLC SERPL-MCNC: 114 MG/DL (ref ?–130)
OSMOLALITY SERPL CALC.SUM OF ELEC: 287 MOSM/KG (ref 275–295)
PH UR STRIP.AUTO: 5 (ref 5–8)
PLATELET # BLD AUTO: 198 10(3)UL (ref 150–450)
POTASSIUM SERPL-SCNC: 3.8 MMOL/L (ref 3.5–5.1)
PROT SERPL-MCNC: 7.9 G/DL (ref 5.7–8.2)
PROT UR STRIP.AUTO-MCNC: NEGATIVE MG/DL
RBC # BLD AUTO: 5.61 X10(6)UL (ref 4.3–5.7)
RBC UR QL AUTO: NEGATIVE
SODIUM SERPL-SCNC: 139 MMOL/L (ref 136–145)
SP GR UR STRIP.AUTO: 1.02 (ref 1–1.03)
T4 FREE SERPL-MCNC: 1.4 NG/DL (ref 0.8–1.7)
TRIGL SERPL-MCNC: 101 MG/DL (ref 30–149)
TSI SER-ACNC: 4.86 UIU/ML (ref 0.55–4.78)
UROBILINOGEN UR STRIP.AUTO-MCNC: NORMAL MG/DL
VLDLC SERPL CALC-MCNC: 17 MG/DL (ref 0–30)
WBC # BLD AUTO: 12.1 X10(3) UL (ref 4–11)

## 2025-08-13 PROCEDURE — 81003 URINALYSIS AUTO W/O SCOPE: CPT

## 2025-08-13 PROCEDURE — 83036 HEMOGLOBIN GLYCOSYLATED A1C: CPT

## 2025-08-13 PROCEDURE — 80053 COMPREHEN METABOLIC PANEL: CPT

## 2025-08-13 PROCEDURE — 80061 LIPID PANEL: CPT

## 2025-08-13 PROCEDURE — 84439 ASSAY OF FREE THYROXINE: CPT

## 2025-08-13 PROCEDURE — 84443 ASSAY THYROID STIM HORMONE: CPT

## 2025-08-13 PROCEDURE — 85025 COMPLETE CBC W/AUTO DIFF WBC: CPT

## 2025-08-13 PROCEDURE — 36415 COLL VENOUS BLD VENIPUNCTURE: CPT

## 2025-08-14 PROBLEM — F41.8 MIXED ANXIETY AND DEPRESSIVE DISORDER: Status: RESOLVED | Noted: 2023-08-10 | Resolved: 2025-08-14

## 2025-08-14 PROBLEM — R73.03 PREDIABETES: Status: ACTIVE | Noted: 2025-08-14
